# Patient Record
Sex: MALE | Race: BLACK OR AFRICAN AMERICAN | HISPANIC OR LATINO
[De-identification: names, ages, dates, MRNs, and addresses within clinical notes are randomized per-mention and may not be internally consistent; named-entity substitution may affect disease eponyms.]

---

## 2020-12-03 PROBLEM — Z00.00 ENCOUNTER FOR PREVENTIVE HEALTH EXAMINATION: Status: ACTIVE | Noted: 2020-12-03

## 2021-01-12 ENCOUNTER — APPOINTMENT (OUTPATIENT)
Dept: CARDIOLOGY | Facility: HOSPITAL | Age: 66
End: 2021-01-12

## 2022-08-31 ENCOUNTER — APPOINTMENT (OUTPATIENT)
Dept: ORTHOPEDIC SURGERY | Facility: CLINIC | Age: 67
End: 2022-08-31

## 2022-08-31 ENCOUNTER — NON-APPOINTMENT (OUTPATIENT)
Age: 67
End: 2022-08-31

## 2022-08-31 DIAGNOSIS — M47.26 OTHER SPONDYLOSIS WITH RADICULOPATHY, LUMBAR REGION: ICD-10-CM

## 2022-08-31 PROCEDURE — 99204 OFFICE O/P NEW MOD 45 MIN: CPT

## 2022-08-31 NOTE — CONSULT LETTER
[Dear  ___] : Dear ~GARETT, [Consult Letter:] : I had the pleasure of evaluating your patient, [unfilled]. [Please see my note below.] : Please see my note below. [Consult Closing:] : Thank you very much for allowing me to participate in the care of this patient.  If you have any questions, please do not hesitate to contact me. [Sincerely,] : Sincerely, [FreeTextEntry2] : [address]\par [facility] [address] [FreeTextEntry3] : Lokesh Bar DO, ATC\par Primary Care Sports Medicine\par [facility] [place]

## 2022-08-31 NOTE — HISTORY OF PRESENT ILLNESS
[de-identified] : Patient is here for LBP that has been a chronic issue. He has a fall about 5 years ago in the tub. He was seen by his PCP who sent him for xrays. He has pain with ambulation. He has pain that radiates down the back of his legs. He feels a tingling sensation on his heels at night. He has recently finished radiation for prostate cancer. He has not been to PT. He was previously prescribed Oxycodone but is not currently taking it. He takes NSAIDs, Tylenol and muscle relaxers. \par \par The patient's past medical history, past surgical history, medications and allergies were reviewed by me today and documented accordingly. In addition, the patient's family and social history, which were noncontributory to this visit, were reviewed also. Intake form was reviewed. The patient has no family history of arthritis.

## 2022-08-31 NOTE — DISCUSSION/SUMMARY
[de-identified] : Discussed findings of today's exam and possible causes of patient's pain.  Educated patient on their most probable diagnosis of chronic 5+ years of intermittent low back pain with recent atraumatic exacerbation due to advanced osteoarthritis and likely concomitant degenerative disc disease.  Reviewed possible courses of treatment, and we collaboratively decided best course of treatment at this time will include conservative management.  Patient was advised that he has advanced osteoarthritis throughout the lumbar spine, he was shown online images of how this is going to correlate with a significant degenerative disc disease and likely nerve impingement/spinal stenosis.  With patient's limited motion on clinical exam and chronicity of his pain he would certainly benefit from MRI of the lumbar spine to evaluate for extent of degenerative disc disease and subsequent physiatry consultation for KIRIT.  I think he would benefit from a course of physical therapy thereafter.  I have concerns that PT would be of limited benefit without proceeding with some sort of pain relieving epidural injection prior to onset of physical therapy.  I advised the patient that we will obtain insurance authorization for MRI and we can follow-up regarding the results once they are available over the phone as he would likely benefit from in person consultation with physiatry at that point.  If there are any significant findings that would require orthopedic spine consultation and appropriate appointment will be made at that time.  Patient started on a course of oral NSAIDs, prescription given for Naprosyn (We discussed all possible side effects of this medication).  Patient appreciates and agrees with current plan.\par \par I work as part of an academic orthopedic group and routinely have a physician in training (resident / fellow) working with me.  Any part of the history and physical exam performed by the physician in training was either directly reviewed and/or replicated by myself.  Any procedure performed by the physician in training was performed under my direct supervision and with the consent of the patient.\par \par This note was generated using dragon medical dictation software.  A reasonable effort has been made for proofreading its contents, but typos may still remain.  If there are any questions or points of clarification needed please notify my office.

## 2022-08-31 NOTE — PHYSICAL EXAM
[de-identified] : Constitutional: Well-nourished, well-developed, No acute distress\par Respiratory:  Good respiratory effort, no SOB\par Lymphatic: No regional lymphadenopathy, no lymphedema\par Psychiatric: Pleasant and normal affect, alert and oriented x3\par Musculoskeletal: normal except where as noted in regional exam\par \par Lumbar Spine Exam\par \par APPEARANCE: no marked deformities or malalignment, + loss of lordotic curvature of the lumbosacral spine. + poor posture\par POSITIVE TENDERNESS: + IL/SI/ST ligs, + TTP of b/l SI joints, + b/l lower lumbar tenderness and spasm noted in erector spinae and quadratus lumborum\par NONTENDER: no bony midline tenderness.\par ROM: Mildly limited in all directions, mildly painful at end range of flexion and extension\par RESISTIVE TESTING: painful 4/5 resisted flex/ext, sidebending b/l, and rotation\par SPECIAL TESTS: neg SLR b/l, neg LORELEI b/l, neg Trendelenburg b/l \par PULSES: 2+ DP/PT pulses\par NEURO:  L1 - S2 intact to sensation and motor, DTRs 2+/4 patella and achilles\par \par \par  [de-identified] : I reviewed, interpreted and clinically correlated the following outside imaging studies,\par [facility] radiology, x-rays of the lumbar spine, 3 views, evidence of moderate to early severe multilevel osteoarthritis with straightening of normal lordosis\par \par ___________\par  \par EXAM:  X-RAY LUMBAR SPINE 2 OR 3 VIEWS\par \par HISTORY:  Lower back pain..\par \par TECHNIQUE:  3 radiographic views of the lumbar spine were obtained.\par \par FINDINGS:  \par \par Vertebrae: Osteopenia. There is normal alignment of the spine without evidence for fracture. There is no evidence of spondylolisthesis. Moderate multilevel osteophyte formation. Mild to moderate degenerative changes lower lumbar facets. No significant lumbar scoliosis.\par \par Number of lumbar vertebrae: 5.\par \par Disc spaces: Preserved.\par \par Sacroiliac joints: Normal.\par \par Soft tissues: Normal.\par \par IMPRESSION: Moderate multilevel degenerative changes lumbar spine.

## 2023-01-04 ENCOUNTER — OFFICE (OUTPATIENT)
Dept: URBAN - METROPOLITAN AREA CLINIC 34 | Facility: CLINIC | Age: 68
Setting detail: OPHTHALMOLOGY
End: 2023-01-04
Payer: COMMERCIAL

## 2023-01-04 DIAGNOSIS — H25.13: ICD-10-CM

## 2023-01-04 DIAGNOSIS — H16.223: ICD-10-CM

## 2023-01-04 DIAGNOSIS — H00.025: ICD-10-CM

## 2023-01-04 DIAGNOSIS — H11.153: ICD-10-CM

## 2023-01-04 DIAGNOSIS — H00.022: ICD-10-CM

## 2023-01-04 PROCEDURE — 99214 OFFICE O/P EST MOD 30 MIN: CPT | Performed by: OPHTHALMOLOGY

## 2023-01-04 ASSESSMENT — AXIALLENGTH_DERIVED
OD_AL: 23.2437
OS_AL: 23.3223

## 2023-01-04 ASSESSMENT — KERATOMETRY
OD_AXISANGLE_DEGREES: 076
OS_AXISANGLE_DEGREES: 046
OS_K1POWER_DIOPTERS: 44.25
METHOD_AUTO_MANUAL: AUTO
OS_K2POWER_DIOPTERS: 44.50
OD_K2POWER_DIOPTERS: 45.50
OD_K1POWER_DIOPTERS: 44.75

## 2023-01-04 ASSESSMENT — LID EXAM ASSESSMENTS
OS_COMMENTS: M2T0
OS_BLEPHARITIS: LUL 1+
OS_MEIBOMITIS: LLL 1+ 2+
OD_COMMENTS: M2T0
OD_MEIBOMITIS: RLL 1+ 2+
OD_BLEPHARITIS: RUL 1+

## 2023-01-04 ASSESSMENT — REFRACTION_CURRENTRX
OD_OVR_VA: 20/
OD_SPHERE: +2.50
OD_CYLINDER: +0.25
OS_VPRISM_DIRECTION: SV
OS_OVR_VA: 20/
OS_CYLINDER: SPHERE
OD_VPRISM_DIRECTION: SV
OS_SPHERE: +2.50

## 2023-01-04 ASSESSMENT — REFRACTION_AUTOREFRACTION
OS_AXIS: 004
OD_SPHERE: -1.00
OD_AXIS: 169
OS_SPHERE: -0.75
OS_CYLINDER: +1.25
OD_CYLINDER: +0.75

## 2023-01-04 ASSESSMENT — CONFRONTATIONAL VISUAL FIELD TEST (CVF)
OS_FINDINGS: FULL
OD_FINDINGS: FULL

## 2023-01-04 ASSESSMENT — VISUAL ACUITY
OD_BCVA: 20/25+2
OS_BCVA: 20/40+1

## 2023-01-04 ASSESSMENT — SPHEQUIV_DERIVED
OS_SPHEQUIV: -0.125
OD_SPHEQUIV: -0.625

## 2023-01-04 ASSESSMENT — DRY EYES - PHYSICIAN NOTES
OS_GENERALCOMMENTS: TEAR FILM DEBRIS
OD_GENERALCOMMENTS: TEAR FILM DEBRIS

## 2023-01-04 ASSESSMENT — TONOMETRY: OS_IOP_MMHG: 18

## 2023-02-08 ENCOUNTER — OFFICE (OUTPATIENT)
Dept: URBAN - METROPOLITAN AREA CLINIC 34 | Facility: CLINIC | Age: 68
Setting detail: OPHTHALMOLOGY
End: 2023-02-08
Payer: MEDICARE

## 2023-02-08 DIAGNOSIS — H25.13: ICD-10-CM

## 2023-02-08 DIAGNOSIS — H25.11: ICD-10-CM

## 2023-02-08 PROBLEM — H16.223 BLEPHARITIS; RIGHT UPPER LID, LEFT UPPER LID: Status: ACTIVE | Noted: 2023-01-04

## 2023-02-08 PROBLEM — H11.153 PINGUECULA; BOTH EYES: Status: ACTIVE | Noted: 2023-01-04

## 2023-02-08 PROBLEM — H16.223 DRY EYE SYNDROME K SICCA; BOTH EYES: Status: ACTIVE | Noted: 2023-01-04

## 2023-02-08 PROBLEM — H00.025 HORDEOLUM INTERNUM; RIGHT LOWER LID, LEFT LOWER LID: Status: ACTIVE | Noted: 2023-01-04

## 2023-02-08 PROBLEM — H00.022 HORDEOLUM INTERNUM; RIGHT LOWER LID, LEFT LOWER LID: Status: ACTIVE | Noted: 2023-01-04

## 2023-02-08 PROCEDURE — 92136 OPHTHALMIC BIOMETRY: CPT | Performed by: OPHTHALMOLOGY

## 2023-02-08 PROCEDURE — 99213 OFFICE O/P EST LOW 20 MIN: CPT | Performed by: OPHTHALMOLOGY

## 2023-02-08 ASSESSMENT — REFRACTION_CURRENTRX
OD_CYLINDER: +0.25
OS_CYLINDER: SPHERE
OD_OVR_VA: 20/
OS_OVR_VA: 20/
OD_SPHERE: +2.50
OD_VPRISM_DIRECTION: SV
OS_SPHERE: +2.50
OS_VPRISM_DIRECTION: SV

## 2023-02-08 ASSESSMENT — LID EXAM ASSESSMENTS
OD_COMMENTS: M2T0
OS_COMMENTS: M2T0
OD_BLEPHARITIS: RUL 1+
OD_MEIBOMITIS: RLL 1+ 2+
OS_BLEPHARITIS: LUL 1+
OS_MEIBOMITIS: LLL 1+ 2+

## 2023-02-08 ASSESSMENT — CONFRONTATIONAL VISUAL FIELD TEST (CVF)
OS_FINDINGS: FULL
OD_FINDINGS: FULL

## 2023-02-08 ASSESSMENT — DRY EYES - PHYSICIAN NOTES
OS_GENERALCOMMENTS: TEAR FILM DEBRIS
OD_GENERALCOMMENTS: TEAR FILM DEBRIS

## 2023-02-08 ASSESSMENT — REFRACTION_AUTOREFRACTION
OD_AXIS: 172
OD_SPHERE: -1.00
OS_CYLINDER: +1.25
OS_AXIS: 025
OS_SPHERE: -1.00
OD_CYLINDER: +0.75

## 2023-02-08 ASSESSMENT — KERATOMETRY
OS_K1POWER_DIOPTERS: 44.25
OD_K2POWER_DIOPTERS: 44.75
OS_K2POWER_DIOPTERS: 44.75
OD_AXISANGLE_DEGREES: 090
OS_AXISANGLE_DEGREES: 049
OD_K1POWER_DIOPTERS: 44.75
METHOD_AUTO_MANUAL: AUTO

## 2023-02-08 ASSESSMENT — AXIALLENGTH_DERIVED
OD_AL: 23.3782
OS_AL: 23.3728

## 2023-02-08 ASSESSMENT — SPHEQUIV_DERIVED
OD_SPHEQUIV: -0.625
OS_SPHEQUIV: -0.375

## 2023-02-08 ASSESSMENT — VISUAL ACUITY
OD_BCVA: 20/25+2
OS_BCVA: 20/40+1

## 2023-02-13 ENCOUNTER — OFFICE (OUTPATIENT)
Dept: URBAN - METROPOLITAN AREA CLINIC 93 | Facility: CLINIC | Age: 68
Setting detail: OPHTHALMOLOGY
End: 2023-02-13
Payer: MEDICARE

## 2023-02-13 DIAGNOSIS — H16.223: ICD-10-CM

## 2023-02-13 DIAGNOSIS — Z01.812: ICD-10-CM

## 2023-02-13 PROCEDURE — 99211 OFF/OP EST MAY X REQ PHY/QHP: CPT | Performed by: OPHTHALMOLOGY

## 2023-02-16 ENCOUNTER — ASC (OUTPATIENT)
Dept: URBAN - METROPOLITAN AREA SURGERY 8 | Facility: SURGERY | Age: 68
Setting detail: OPHTHALMOLOGY
End: 2023-02-16
Payer: MEDICARE

## 2023-02-16 DIAGNOSIS — H52.211: ICD-10-CM

## 2023-02-16 DIAGNOSIS — H25.11: ICD-10-CM

## 2023-02-16 PROCEDURE — 66984 XCAPSL CTRC RMVL W/O ECP: CPT | Performed by: OPHTHALMOLOGY

## 2023-02-16 PROCEDURE — FEMTO CATARACT LASER: Performed by: OPHTHALMOLOGY

## 2023-02-17 ENCOUNTER — OFFICE (OUTPATIENT)
Dept: URBAN - METROPOLITAN AREA CLINIC 35 | Facility: CLINIC | Age: 68
Setting detail: OPHTHALMOLOGY
End: 2023-02-17
Payer: MEDICARE

## 2023-02-17 DIAGNOSIS — Z96.1: ICD-10-CM

## 2023-02-17 DIAGNOSIS — H25.12: ICD-10-CM

## 2023-02-17 PROCEDURE — 92136 OPHTHALMIC BIOMETRY: CPT | Performed by: OPHTHALMOLOGY

## 2023-02-17 PROCEDURE — 99024 POSTOP FOLLOW-UP VISIT: CPT | Performed by: OPHTHALMOLOGY

## 2023-02-17 ASSESSMENT — REFRACTION_AUTOREFRACTION
OS_SPHERE: -19.00
OS_AXIS: 004
OD_CYLINDER: +1.00
OD_SPHERE: -1.25
OS_CYLINDER: +0.25
OD_AXIS: 085

## 2023-02-17 ASSESSMENT — DRY EYES - PHYSICIAN NOTES
OD_GENERALCOMMENTS: TEAR FILM DEBRIS
OS_GENERALCOMMENTS: TEAR FILM DEBRIS

## 2023-02-17 ASSESSMENT — CONFRONTATIONAL VISUAL FIELD TEST (CVF)
OS_FINDINGS: FULL
OD_FINDINGS: FULL

## 2023-02-17 ASSESSMENT — KERATOMETRY
OD_AXISANGLE_DEGREES: 100
OS_AXISANGLE_DEGREES: 057
OS_K1POWER_DIOPTERS: 44.00
OD_K1POWER_DIOPTERS: 44.00
OD_K2POWER_DIOPTERS: 44.75
OS_K2POWER_DIOPTERS: 44.25

## 2023-02-17 ASSESSMENT — VISUAL ACUITY
OS_BCVA: 20/30+2
OD_BCVA: 20/25-2

## 2023-02-17 ASSESSMENT — SPHEQUIV_DERIVED
OD_SPHEQUIV: -0.75
OS_SPHEQUIV: -18.875

## 2023-02-17 ASSESSMENT — TONOMETRY
OS_IOP_MMHG: 20
OD_IOP_MMHG: 20

## 2023-02-20 ENCOUNTER — OFFICE (OUTPATIENT)
Facility: LOCATION | Age: 68
Setting detail: OPHTHALMOLOGY
End: 2023-02-20
Payer: MEDICARE

## 2023-02-20 DIAGNOSIS — Z01.812: ICD-10-CM

## 2023-02-20 PROCEDURE — 99211 OFF/OP EST MAY X REQ PHY/QHP: CPT | Performed by: OPHTHALMOLOGY

## 2023-02-23 ENCOUNTER — ASC (OUTPATIENT)
Dept: URBAN - METROPOLITAN AREA SURGERY 8 | Facility: SURGERY | Age: 68
Setting detail: OPHTHALMOLOGY
End: 2023-02-23
Payer: MEDICARE

## 2023-02-23 DIAGNOSIS — H25.12: ICD-10-CM

## 2023-02-23 PROCEDURE — 66984 XCAPSL CTRC RMVL W/O ECP: CPT | Performed by: OPHTHALMOLOGY

## 2023-02-24 ENCOUNTER — OFFICE (OUTPATIENT)
Dept: URBAN - METROPOLITAN AREA CLINIC 34 | Facility: CLINIC | Age: 68
Setting detail: OPHTHALMOLOGY
End: 2023-02-24
Payer: MEDICARE

## 2023-02-24 DIAGNOSIS — Z96.1: ICD-10-CM

## 2023-02-24 PROBLEM — H25.12 CATARACT SENILE NUCLEAR SCLEROSIS; LEFT EYE: Status: RESOLVED | Noted: 2023-02-17 | Resolved: 2023-02-24

## 2023-02-24 PROBLEM — Z01.812 ENCOUNTER FOR PREPROCEDURAL LABORATORY EXAMINATION: Status: RESOLVED | Noted: 2023-02-14 | Resolved: 2023-02-24

## 2023-02-24 PROCEDURE — 99024 POSTOP FOLLOW-UP VISIT: CPT | Performed by: OPHTHALMOLOGY

## 2023-02-24 ASSESSMENT — CONFRONTATIONAL VISUAL FIELD TEST (CVF)
OD_FINDINGS: FULL
OS_FINDINGS: FULL

## 2023-02-24 ASSESSMENT — DRY EYES - PHYSICIAN NOTES
OS_GENERALCOMMENTS: TEAR FILM DEBRIS
OD_GENERALCOMMENTS: TEAR FILM DEBRIS

## 2023-02-28 ASSESSMENT — REFRACTION_AUTOREFRACTION
OD_AXIS: 078
OD_CYLINDER: +0.25
OS_CYLINDER: +0.25
OS_AXIS: 114
OS_SPHERE: -0.25
OD_SPHERE: -0.25

## 2023-02-28 ASSESSMENT — VISUAL ACUITY
OD_BCVA: 20/25+2
OS_BCVA: 20/20

## 2023-02-28 ASSESSMENT — SPHEQUIV_DERIVED
OD_SPHEQUIV: -0.125
OS_SPHEQUIV: -0.125